# Patient Record
Sex: MALE | URBAN - METROPOLITAN AREA
[De-identification: names, ages, dates, MRNs, and addresses within clinical notes are randomized per-mention and may not be internally consistent; named-entity substitution may affect disease eponyms.]

---

## 2024-09-20 ENCOUNTER — TELEPHONE (OUTPATIENT)
Dept: SURGERY | Facility: CLINIC | Age: 29
End: 2024-09-20

## 2024-09-20 NOTE — TELEPHONE ENCOUNTER
Left Voicemail (1st Attempt) for the patient to call back and schedule the following:    Appointment type: Microscopy  Provider: Dr. Burris  Return date: 10/8 ok to use a held slot  Specialty phone number: [phone]  Additional appointment(s) needed: n/a  Additonal Notes: for [facility] per message from [place] SIMON RN    Left direct #

## 2024-09-23 ENCOUNTER — TELEPHONE (OUTPATIENT)
Dept: SURGERY | Facility: CLINIC | Age: 29
End: 2024-09-23

## 2024-09-23 NOTE — TELEPHONE ENCOUNTER
Left Voicemail (2nd Attempt) for the patient to call back and schedule the following:    Appointment type: Microscopy  Provider:   Return date: 10/08/2024 ( held slot )   Specialty phone number: [phone]  Additional appointment(s) needed: n/a  Additonal Notes: [facility]